# Patient Record
Sex: MALE | Race: WHITE | Employment: OTHER | URBAN - METROPOLITAN AREA
[De-identification: names, ages, dates, MRNs, and addresses within clinical notes are randomized per-mention and may not be internally consistent; named-entity substitution may affect disease eponyms.]

---

## 2024-10-16 ENCOUNTER — OFFICE VISIT (OUTPATIENT)
Dept: NEUROLOGY | Age: 84
End: 2024-10-16
Payer: MEDICARE

## 2024-10-16 VITALS
BODY MASS INDEX: 21.08 KG/M2 | SYSTOLIC BLOOD PRESSURE: 144 MMHG | WEIGHT: 131.2 LBS | OXYGEN SATURATION: 94 % | HEART RATE: 73 BPM | HEIGHT: 66 IN | DIASTOLIC BLOOD PRESSURE: 78 MMHG

## 2024-10-16 DIAGNOSIS — R45.4 IRRITABLE BEHAVIOR: ICD-10-CM

## 2024-10-16 DIAGNOSIS — F03.B2 MODERATE DEMENTIA WITH PSYCHOTIC DISTURBANCE, UNSPECIFIED DEMENTIA TYPE (HCC): Primary | ICD-10-CM

## 2024-10-16 DIAGNOSIS — F03.911 AGITATION DUE TO DEMENTIA (HCC): ICD-10-CM

## 2024-10-16 DIAGNOSIS — F03.92 HALLUCINATIONS DUE TO LATE ONSET DEMENTIA (HCC): ICD-10-CM

## 2024-10-16 PROCEDURE — G8484 FLU IMMUNIZE NO ADMIN: HCPCS | Performed by: PSYCHIATRY & NEUROLOGY

## 2024-10-16 PROCEDURE — 99205 OFFICE O/P NEW HI 60 MIN: CPT | Performed by: PSYCHIATRY & NEUROLOGY

## 2024-10-16 PROCEDURE — G8420 CALC BMI NORM PARAMETERS: HCPCS | Performed by: PSYCHIATRY & NEUROLOGY

## 2024-10-16 PROCEDURE — 1123F ACP DISCUSS/DSCN MKR DOCD: CPT | Performed by: PSYCHIATRY & NEUROLOGY

## 2024-10-16 PROCEDURE — G8427 DOCREV CUR MEDS BY ELIG CLIN: HCPCS | Performed by: PSYCHIATRY & NEUROLOGY

## 2024-10-16 PROCEDURE — 4004F PT TOBACCO SCREEN RCVD TLK: CPT | Performed by: PSYCHIATRY & NEUROLOGY

## 2024-10-16 RX ORDER — VALPROIC ACID 250 MG/1
250 CAPSULE, LIQUID FILLED ORAL 2 TIMES DAILY
Qty: 180 CAPSULE | Refills: 3 | Status: SHIPPED | OUTPATIENT
Start: 2024-10-16 | End: 2025-10-16

## 2024-10-16 RX ORDER — QUETIAPINE FUMARATE 50 MG/1
50 TABLET, FILM COATED ORAL NIGHTLY
Qty: 90 TABLET | Refills: 3 | Status: SHIPPED | OUTPATIENT
Start: 2024-10-16 | End: 2025-10-16

## 2024-10-16 RX ORDER — TRIAMCINOLONE ACETONIDE 1 MG/G
OINTMENT TOPICAL 2 TIMES DAILY
COMMUNITY
Start: 2024-08-26

## 2024-10-16 NOTE — PROGRESS NOTES
supplementation.    Pertinent positive signs and symptoms: Patient does NOT having the following:   Memory loss: forgetting recent events and details, misplacing personal items, asking repetitive questions, missing appointments, and struggling with book-keeping/bills  Language: difficulty retrieving words or names  Executive: problems organizing or multitasking, problems maintaining focus / distractibility, difficulty reasoning or problem-solving, poor judgment / inappropriate behaviors, problems with calculations, difficulty using devices/technology, and disorientation to time or place  Visuospatial: navigation problems/getting lost  ---  Behavioral: irritability, emotional lability, impulsivity or disinhibition, agitation, hallucinations, and aggressive behaviors   Memory loss: long-term memory loss  Language: problems comprehending language, effortful or nonfluent speech, and problems reading and writing    Visuospatial: difficulty locating items in plain sight and problems recognizing faces or objects  ---  Behavioral: depression and insight into own illness  Sleep: loud snoring and difficulty falling asleep       Review of Systems:   All systems were reviewed and all relevant findings are addressed in the history and exam.   Neurological ROS per HPI.    Lab/Imaging Review:   I reviewed pertinent labs, images, and reports, explaining or showing relevant findings to the patient.    MRI Result (most recent):  MRI SHOULDER LEFT WO CONTRAST 06/26/2020    Narrative  EXAM:  MRI shoulder, left    COMPARISON:  None.    INDICATION: M25.512 Pain in left shoulder I10;  9 - Indicated    TECHNICAL:  Axial PDFS, Coronal T2, Coronal PDFS, Sagittal T2, and Oblique Sagittal T2FS sequences performed.    FINDINGS:  Exam is limited secondary to patient motion. Multiple repeats were performed. This images were submitted for interpretation.    Osseous Structures/Neurovascular:  The osseous marrow demonstrates normal signal intensity